# Patient Record
Sex: FEMALE
[De-identification: names, ages, dates, MRNs, and addresses within clinical notes are randomized per-mention and may not be internally consistent; named-entity substitution may affect disease eponyms.]

---

## 2024-01-15 ENCOUNTER — RX ONLY (OUTPATIENT)
Age: 59
Setting detail: RX ONLY
End: 2024-01-15

## 2024-01-15 ENCOUNTER — APPOINTMENT (RX ONLY)
Dept: URBAN - METROPOLITAN AREA CLINIC 88 | Facility: CLINIC | Age: 59
Setting detail: DERMATOLOGY
End: 2024-01-15

## 2024-01-15 DIAGNOSIS — Z01.818 ENCOUNTER FOR OTHER PREPROCEDURAL EXAMINATION: ICD-10-CM

## 2024-01-15 PROCEDURE — ? ADDITIONAL NOTES

## 2024-01-15 RX ORDER — CYCLOBENZAPRINE HYDROCHLORIDE 5 MG/1
TABLET, FILM COATED ORAL
Qty: 30 | Refills: 0 | Status: ERX | COMMUNITY
Start: 2024-01-15

## 2024-01-15 RX ORDER — ONDANSETRON 4 MG/1
TABLET, ORALLY DISINTEGRATING ORAL
Qty: 15 | Refills: 0 | Status: ERX | COMMUNITY
Start: 2024-01-15

## 2024-01-15 RX ORDER — APREPITANT 40 MG/1
CAPSULE ORAL
Qty: 1 | Refills: 0 | Status: ERX | COMMUNITY
Start: 2024-01-15

## 2024-01-15 RX ORDER — CLINDAMYCIN HYDROCHLORIDE 300 MG/1
CAPSULE ORAL
Qty: 21 | Refills: 0 | Status: ERX | COMMUNITY
Start: 2024-01-15

## 2024-01-15 NOTE — PROCEDURE: ADDITIONAL NOTES
Additional Notes: ***ALLERGY :PENICILLIN****\\nPercocet script will be given day of surgery- \\n\\nSX Date: 02/07/24 SX - Explant, lift. (TBD). -[place]- coming on the 6th\\nPre-op call 01/15/24\\n\\nHT: 5'2 / WT: 130 lbs /\\nAllergies: Penicillin (severe hives and fever as a kid) ***\\nPMH: Biopsy of breast: Abnormal mammogram in October, 2022. NOV: nodule right breast benign, scar tissue. Will provide copies of reports, January 26, 2023, a left breast localized excisional biopsy was performed. Will provide copy of surgical pathology report., 1991 left ovary removed due to Endometriosis.\\nNodule/cyst on right breast that should be monitor, too close to the implant to do biopsy. (following up with surgeon, last visit june/july last year).\\nEndometriosis 1991 left ovary removed, osteoporosis, denies cv issues, denies dvt.\\nFamily history of breast cancer aunt and cousin from father side.\\nHospitalizations: Sx to remove left ovary.\\nMeds: Prolia every 6 months for osteoporosis,\\nSX HX: Retropectoral breast implants surgery in February, 2017 in [facility], AISHA REBOLLEDO (280cc);\\nHx of Nausea after sx: No\\nSmoke/Vape/Hookah: Denies.\\nETOH: Rarely once a year.\\nImplants: Yes.\\nPath: Surgeon's discretion. (would like to talk to  Day of surgery).\\n\\nExparel: TBD. info sent.\\nMyers: TBD info sent.\\nBB: TBD info sent.\\n\\nSister will be taking care of her.\\nMeds sent to pharmacy x 4 and pending Percocet PRN pain \"acute pain exception\".\\nFollow up visits scheduled, patient notified.\\nLab-work CBC w Diff, CMP / PT/ PTT/ INR/ EKG / CXR / Mammogram and biopsy report / clearance and assessment / Photos Requested.\\nRecommendations prior to surgery were given.\\nStop blood thinners 2 weeks prior.\\nNicotine test day of sx.\\nPt verbalized understanding and agreement.
Render Risk Assessment In Note?: no

## 2024-02-07 ENCOUNTER — APPOINTMENT (RX ONLY)
Dept: URBAN - METROPOLITAN AREA CLINIC 88 | Facility: CLINIC | Age: 59
Setting detail: DERMATOLOGY
End: 2024-02-07

## 2024-02-07 ENCOUNTER — RX ONLY (OUTPATIENT)
Age: 59
Setting detail: RX ONLY
End: 2024-02-07

## 2024-02-07 DIAGNOSIS — Z41.9 ENCOUNTER FOR PROCEDURE FOR PURPOSES OTHER THAN REMEDYING HEALTH STATE, UNSPECIFIED: ICD-10-CM

## 2024-02-07 PROCEDURE — ? OPERATIVE NOTE - BRIEF

## 2024-02-07 RX ORDER — TRAMADOL HYDROCHLORIDE 50 MG/1
TABLET, FILM COATED ORAL
Qty: 28 | Refills: 0 | Status: ERX | COMMUNITY
Start: 2024-02-07

## 2024-02-07 NOTE — PROCEDURE: OPERATIVE NOTE - BRIEF
Position: supine
Detail Level: Detailed
Surgeon: Dr. Curt Augustine DO FACS
Estimated Blood Loss (Cc): minimal
Monitoring: Full continuous cardiac monitoring and automated blood-pressure measurements were performed per protocol.
Additional Epidermal Closure: horizontal mattress

## 2024-02-08 ENCOUNTER — APPOINTMENT (RX ONLY)
Dept: URBAN - METROPOLITAN AREA CLINIC 88 | Facility: CLINIC | Age: 59
Setting detail: DERMATOLOGY
End: 2024-02-08

## 2024-02-08 PROCEDURE — ? COUNSELING - POST-OP CHECK, BREAST IMPLANT REMOVAL

## 2024-02-08 PROCEDURE — 99024 POSTOP FOLLOW-UP VISIT: CPT

## 2024-02-08 NOTE — PROCEDURE: COUNSELING - POST-OP CHECK, BREAST IMPLANT REMOVAL
Count Minor/Major Decisions Toward Mdm (Not Cosmetic)?: No
Detail Level: Simple
Add Postop Global No-Charge Code (19117)?: yes

## 2024-02-13 ENCOUNTER — APPOINTMENT (RX ONLY)
Dept: URBAN - METROPOLITAN AREA CLINIC 88 | Facility: CLINIC | Age: 59
Setting detail: DERMATOLOGY
End: 2024-02-13

## 2024-02-13 PROCEDURE — 99024 POSTOP FOLLOW-UP VISIT: CPT

## 2024-02-13 PROCEDURE — ? COUNSELING - POST-OP CHECK, BREAST IMPLANT REMOVAL

## 2024-02-13 NOTE — PROCEDURE: COUNSELING - POST-OP CHECK, BREAST IMPLANT REMOVAL
Detail Level: Simple
Add Postop Global No-Charge Code (61120)?: yes
Count Minor/Major Decisions Toward Mdm (Not Cosmetic)?: No

## 2024-02-14 ENCOUNTER — APPOINTMENT (RX ONLY)
Dept: URBAN - METROPOLITAN AREA CLINIC 88 | Facility: CLINIC | Age: 59
Setting detail: DERMATOLOGY
End: 2024-02-14

## 2024-02-23 ENCOUNTER — APPOINTMENT (RX ONLY)
Dept: URBAN - METROPOLITAN AREA CLINIC 88 | Facility: CLINIC | Age: 59
Setting detail: DERMATOLOGY
End: 2024-02-23

## 2024-04-19 ENCOUNTER — APPOINTMENT (RX ONLY)
Dept: URBAN - METROPOLITAN AREA CLINIC 88 | Facility: CLINIC | Age: 59
Setting detail: DERMATOLOGY
End: 2024-04-19